# Patient Record
Sex: FEMALE | Race: WHITE | Employment: OTHER | URBAN - METROPOLITAN AREA
[De-identification: names, ages, dates, MRNs, and addresses within clinical notes are randomized per-mention and may not be internally consistent; named-entity substitution may affect disease eponyms.]

---

## 2020-07-27 ENCOUNTER — RECORDS - HEALTHEAST (OUTPATIENT)
Dept: LAB | Facility: CLINIC | Age: 70
End: 2020-07-27

## 2020-07-27 LAB — LIPASE SERPL-CCNC: 207 U/L (ref 0–52)

## 2020-07-30 ENCOUNTER — RECORDS - HEALTHEAST (OUTPATIENT)
Dept: LAB | Facility: CLINIC | Age: 70
End: 2020-07-30

## 2020-07-31 LAB
ALBUMIN SERPL-MCNC: 3.6 G/DL (ref 3.5–5)
ALP SERPL-CCNC: 75 U/L (ref 45–120)
ALT SERPL W P-5'-P-CCNC: 81 U/L (ref 0–45)
ANION GAP SERPL CALCULATED.3IONS-SCNC: 13 MMOL/L (ref 5–18)
AST SERPL W P-5'-P-CCNC: 62 U/L (ref 0–40)
BILIRUB SERPL-MCNC: 0.5 MG/DL (ref 0–1)
BUN SERPL-MCNC: 21 MG/DL (ref 8–22)
CALCIUM SERPL-MCNC: 10.1 MG/DL (ref 8.5–10.5)
CHLORIDE BLD-SCNC: 102 MMOL/L (ref 98–107)
CO2 SERPL-SCNC: 25 MMOL/L (ref 22–31)
CREAT SERPL-MCNC: 0.91 MG/DL (ref 0.6–1.1)
GFR SERPL CREATININE-BSD FRML MDRD: >60 ML/MIN/1.73M2
GLUCOSE BLD-MCNC: 103 MG/DL (ref 70–125)
LIPASE SERPL-CCNC: 307 U/L (ref 0–52)
POTASSIUM BLD-SCNC: 4.3 MMOL/L (ref 3.5–5)
PROT SERPL-MCNC: 7.1 G/DL (ref 6–8)
SODIUM SERPL-SCNC: 140 MMOL/L (ref 136–145)

## 2020-08-11 ENCOUNTER — RECORDS - HEALTHEAST (OUTPATIENT)
Dept: LAB | Facility: CLINIC | Age: 70
End: 2020-08-11

## 2020-08-11 LAB — LIPASE SERPL-CCNC: 177 U/L (ref 0–52)

## 2020-08-31 ENCOUNTER — RECORDS - HEALTHEAST (OUTPATIENT)
Dept: LAB | Facility: CLINIC | Age: 70
End: 2020-08-31

## 2020-08-31 LAB — LIPASE SERPL-CCNC: 84 U/L (ref 0–52)

## 2020-10-01 ENCOUNTER — RECORDS - HEALTHEAST (OUTPATIENT)
Dept: LAB | Facility: CLINIC | Age: 70
End: 2020-10-01

## 2020-10-01 LAB — LIPASE SERPL-CCNC: 97 U/L (ref 0–52)

## 2020-10-23 ENCOUNTER — RECORDS - HEALTHEAST (OUTPATIENT)
Dept: LAB | Facility: CLINIC | Age: 70
End: 2020-10-23

## 2020-10-23 LAB
ALBUMIN SERPL-MCNC: 4 G/DL (ref 3.5–5)
ALP SERPL-CCNC: 70 U/L (ref 45–120)
ALT SERPL W P-5'-P-CCNC: 74 U/L (ref 0–45)
ANION GAP SERPL CALCULATED.3IONS-SCNC: 12 MMOL/L (ref 5–18)
AST SERPL W P-5'-P-CCNC: 51 U/L (ref 0–40)
BILIRUB SERPL-MCNC: 0.7 MG/DL (ref 0–1)
BUN SERPL-MCNC: 16 MG/DL (ref 8–22)
CALCIUM SERPL-MCNC: 9.8 MG/DL (ref 8.5–10.5)
CHLORIDE BLD-SCNC: 106 MMOL/L (ref 98–107)
CHOLEST SERPL-MCNC: 228 MG/DL
CO2 SERPL-SCNC: 25 MMOL/L (ref 22–31)
CREAT SERPL-MCNC: 0.83 MG/DL (ref 0.6–1.1)
FASTING STATUS PATIENT QL REPORTED: ABNORMAL
GFR SERPL CREATININE-BSD FRML MDRD: >60 ML/MIN/1.73M2
GLUCOSE BLD-MCNC: 101 MG/DL (ref 70–125)
HDLC SERPL-MCNC: 46 MG/DL
LDLC SERPL CALC-MCNC: 115 MG/DL
LIPASE SERPL-CCNC: 67 U/L (ref 0–52)
POTASSIUM BLD-SCNC: 4.1 MMOL/L (ref 3.5–5)
PROT SERPL-MCNC: 7.3 G/DL (ref 6–8)
SODIUM SERPL-SCNC: 143 MMOL/L (ref 136–145)
TRIGL SERPL-MCNC: 335 MG/DL

## 2021-10-21 ENCOUNTER — LAB REQUISITION (OUTPATIENT)
Dept: LAB | Facility: CLINIC | Age: 71
End: 2021-10-21
Payer: COMMERCIAL

## 2021-10-21 DIAGNOSIS — K85.10 BILIARY ACUTE PANCREATITIS WITHOUT NECROSIS OR INFECTION: ICD-10-CM

## 2021-10-21 LAB
ALBUMIN SERPL-MCNC: 3.7 G/DL (ref 3.5–5)
ALP SERPL-CCNC: 62 U/L (ref 45–120)
ALT SERPL W P-5'-P-CCNC: 42 U/L (ref 0–45)
AST SERPL W P-5'-P-CCNC: 31 U/L (ref 0–40)
BILIRUB DIRECT SERPL-MCNC: 0.2 MG/DL
BILIRUB SERPL-MCNC: 0.8 MG/DL (ref 0–1)
LIPASE SERPL-CCNC: 84 U/L (ref 0–52)
PROT SERPL-MCNC: 7.1 G/DL (ref 6–8)

## 2021-10-21 PROCEDURE — 80076 HEPATIC FUNCTION PANEL: CPT | Mod: ORL | Performed by: FAMILY MEDICINE

## 2021-10-21 PROCEDURE — 83690 ASSAY OF LIPASE: CPT | Mod: ORL | Performed by: FAMILY MEDICINE

## 2022-04-20 ENCOUNTER — LAB REQUISITION (OUTPATIENT)
Dept: LAB | Facility: CLINIC | Age: 72
End: 2022-04-20
Payer: COMMERCIAL

## 2022-04-20 ENCOUNTER — TRANSFERRED RECORDS (OUTPATIENT)
Dept: HEALTH INFORMATION MANAGEMENT | Facility: CLINIC | Age: 72
End: 2022-04-20

## 2022-04-20 DIAGNOSIS — E78.2 MIXED HYPERLIPIDEMIA: ICD-10-CM

## 2022-04-20 LAB
CHOLEST SERPL-MCNC: 221 MG/DL
HBA1C MFR BLD: 5.6 % (ref 4.2–6.1)
HDLC SERPL-MCNC: 44 MG/DL
LDLC SERPL CALC-MCNC: 135 MG/DL
TRIGL SERPL-MCNC: 210 MG/DL

## 2022-04-20 PROCEDURE — 80061 LIPID PANEL: CPT | Mod: ORL | Performed by: NURSE PRACTITIONER

## 2022-04-20 PROCEDURE — 83525 ASSAY OF INSULIN: CPT | Mod: ORL | Performed by: NURSE PRACTITIONER

## 2022-04-21 LAB — INSULIN SERPL-ACNC: 31.5 MU/L (ref 3–25)

## 2022-06-28 ENCOUNTER — MEDICAL CORRESPONDENCE (OUTPATIENT)
Dept: HEALTH INFORMATION MANAGEMENT | Facility: CLINIC | Age: 72
End: 2022-06-28

## 2022-07-01 ENCOUNTER — TELEPHONE (OUTPATIENT)
Dept: NUTRITION | Facility: CLINIC | Age: 72
End: 2022-07-01

## 2022-07-01 NOTE — TELEPHONE ENCOUNTER
Patient called asking if we can submit a prior authorization or pre-approval to her insurance company. She has had this done before with other medical departments and hopes we can do the same. Informed patient that Medicare and supplemental plans usually do not cover MNT unless it is for diabetes or CKD. She has called her insurance company and they informed her we are in her insurance network and should be covered if there is a referral. However, they cannot guarantee coverage until it is billed. She stated that her insurance is really good and even though some doctors' office will tell her certain visits aren't covered, her insurance has covered them. Please advise if a prior authorization or pre-approval can be done to determine if her visit will be covered.    Ruma BERTRAND  Central Scheduler

## 2022-07-01 NOTE — TELEPHONE ENCOUNTER
[facility] has checked on this for other patients and right now, [facility] does not do a prior authorization for a Nutrition visit.  Patient has a nutrition referral and have been advised to have patient check on insurance coverage but a waiver still needs to be signed to let them know that Medicare does not cover Nutrition Visits unless it is for CKD and DM.    Would recommend patient document who she spoke to about coverage in case there are any issues.  Not sure if she provided the visit code for them (01616) and see if they can verify for her?     Jhoana De La Rosa RDN, LD, [facility]

## 2022-07-05 NOTE — TELEPHONE ENCOUNTER
Called patient and left her detailed message. See notes below.    Ruma BERTRAND  Central Scheduler

## 2022-08-24 ENCOUNTER — OFFICE VISIT (OUTPATIENT)
Dept: NUTRITION | Facility: CLINIC | Age: 72
End: 2022-08-24
Payer: COMMERCIAL

## 2022-08-24 VITALS — BODY MASS INDEX: 40.18 KG/M2 | HEIGHT: 66 IN | WEIGHT: 250 LBS

## 2022-08-24 DIAGNOSIS — E66.01 MORBID OBESITY (H): Primary | ICD-10-CM

## 2022-08-24 PROCEDURE — 97802 MEDICAL NUTRITION INDIV IN: CPT

## 2022-08-24 NOTE — PROGRESS NOTES
Medical Nutrition Therapy  Visit Type:Initial assessment and intervention    Shira Sauer presents today for MNT and education related to weight management.   She is accompanied by self.  Non-Medicare ABN was obtained.    ASSESSMENT:   Patient comments/concerns relating to nutrition: Says she was told she should be on a low cholesterol diet for fatty liver. She has high triglycerides so was told she needs a low-carbohydrate diet and high fiber.      Says she needs help understanding what she should and should not be eating. Says changed to Zevia drink instead of diet pop. Says she already lost 20 lbs but has hit a plateau.    NUTRITION HISTORY:    Breakfast: 8 oz 1% milk, 5.3 oz Dannon light & Fit Greek Cherry yogurt with handful of blueberries and cinnamon, 2 sl. Center cut menjivar, 2 fried in in Lauren spray OR yogurt with blueberries, oatmeal pkt (100 kcals) and 8oz milk with Vitamins OR Pappas bar and shake  AM: grapes OR none OR handful pistachios and Ice drink (5 kcals)  Lunch: Atkins bar and shake, 1/4 cup pistachios and Zevia cola OR 8 oz iced tea, 1 ear corn with butter, leftovers (1 rib, few green beans, 3 small beef pieces) OR salad with walnuts, chicken, blue cheese, raspberry vinaigrette and iced tea with lemon   PM: 2 lemon drops OR Zevia coke, 1 T. PB, handful pistachios OR none  Dinner: salmon and goat cheese salad with lettuce, craisins, apples, tomatoes and raspberry vinaigrette and roll with honey butter (out) OR Atkins shake and bar OR eggs benedict (2 eggs, 1 veggie cake, 1 slice low-fat cheese and hollandaise sauce, 4 oz OJ  Snacks: handful pistachios OR none (day 1&3)  Beverages: Tea  0-1/day and Water 4-5 cups/day    Misses meals? none  Eats out:  At least 1 meals/per week     Previous diet education:  No     Food allergies/intolerances: none    Diet is high in: protein  Diet is low in: fiber, fruits and vegetables    EXERCISE: Silver Sneakers for 45 minutes, 3 times a week. Says walks  "around a lot.     SOCIO/ECONOMIC:   Lives with: spouse. He does most of the cooking and uses mostly olive oil.     MEDICATIONS:  No current outpatient medications on file.     No current facility-administered medications for this visit.       LABS:  Last Basic Metabolic Panel:  Lab Results   Component Value Date     10/23/2020      Lab Results   Component Value Date    POTASSIUM 4.1 10/23/2020     Lab Results   Component Value Date    CHLORIDE 106 10/23/2020     Lab Results   Component Value Date    WAYLON 9.8 10/23/2020     Lab Results   Component Value Date    CO2 25 10/23/2020     Lab Results   Component Value Date    BUN 16 10/23/2020     Lab Results   Component Value Date    CR 0.83 10/23/2020     Lab Results   Component Value Date     10/23/2020     Recent Labs   Lab Test 04/20/22  0931 10/23/20  1335   CHOL 221* 228*   HDL 44* 46*   * 115   TRIG 210* 335*       ANTHROPOMETRICS:  Vitals: There were no vitals taken for this visit.  Estimated body mass index is 40.97 kg/m  as calculated from the following:    Height as of this encounter: 1.664 m (5' 5.5\").    Weight as of this encounter: 113.4 kg (250 lb).       Wt Readings from Last 5 Encounters:   No data found for Wt       Weight Change: unable to assess- patient declined weight today    ESTIMATED KCAL REQUIREMENTS:  1998 kcal per day (based on last weight measurements in referral on 6/28/22)    NUTRITION DIAGNOSIS: Food- and nutrition-related knowledge deficit related to healthy eating to help improve lipid panel and weight loss as evidenced by patient stating she is not sure how to eat to improve her cholesterol and has reached a plateau with weight loss.     NUTRITION INTERVENTION:  Nutrition Prescription: Energy Intake: 2069-7240 kcal/day for weight loss  Saturated Fat Intake: <13 grams/day (7% kcals)  Sodium Intake: <2300 mg/day  Fiber: 25-35 gm/day  Education given to support: general nutrition guidelines, weight reduction, consistent " meals, labeling, artificial sweeteners, sodium, fat modification, exercise, fiber, behavior modification, portion control and heart healthy diet  Education Materials Provided: 1500 Calorie 5-day Sample Menus, 100 Calorie Snacks, Weight Loss Tips, Cooking Tips for Weight Management, High Triglycerides Nutrition Therapy and Heart-Healthy Fiber Tips  Motivational Interviewing    Discussion: Reviewed patient's 24 hour diet recall and answered questions about healthy eating to help improve cholesterol.  Reviewed label reading and provided tips on controlling and limiting saturated fat and added sugars and increasing fiber. She brought in food labels to review and reassured Shira that most of her food choices/changes looked great. Only one product (Adkin's bar) had more saturated fat than ideal so discussed finding alternative or eating 1/2 bar and adding some fruit or veggies.  Encouraged her to increase plant foods such as whole grains, bean, nuts, seeds, fruits and vegetables to help increase fiber. Heart healthy fats were encouraged in moderation, as was lean meats and heathy preparation methods to help with weight loss and improve LDL cholesterol.  Reviewed label reading and patient encouraged to limit saturated fat to 13 gm/day and read ingredient list to help avoid trans fat consumption.      Overall, per diet recall, already appears to be minimal in added sugar and not high in saturated fat as she says eating red meat is rare. She wanted to know how much sodium to limit and informed her if blood pressure is good, try to keep <2300 mg/day.  Since she is struggling with weight loss, suggested trying to record meals for 3-4 days and take average and then either reduce by 100-200 calories if eating more than 1500 calories a day or try to increase by 100 calories for 1-2 week and then cut back to see if this helps overcome plateau. If too calorie heavy, suggested adding some fruits and vegetables with meals so  making up 1/2 the meal and try cutting back on other foods/bars/shakes for lunch, dinner and/or snacks; she already adds fruit with most breakfast.  Based on one day when added up calories, between 7222-7586 a day. Reviewed benefits of being active and commended patient on already including some strength/resistance 3 days a week with Silver Sneakers and to keep adding activity as tolerated to help with weight loss.  Pt verbalized understanding of concepts discussed and recommendations provided.         PATIENT'S BEHAVIOR CHANGE GOALS:   See Patient Instructions for patient stated behavior change goals. AVS was printed and given to patient at today's appointment.    MONITOR / EVALUATE:  RD will monitor/evaluate:  Food / Beverage / Nutrient intake   Pertinent Labs  Progress toward meeting stated nutrition-related goals  Weight change    FOLLOW-UP:  Follow up with RD as needed.  Call RD with questions/concerns. Follow up offered but declined as patient wanted to make sure visit covered (was told by her insurance it would be covered).    Jhoana De La Rosa RDN, LD, [facility]   Time spent in minutes: 60 minutes  Encounter: Individual

## 2022-08-24 NOTE — PATIENT INSTRUCTIONS
Limit saturated fat to <13 gm/day.  -Tip: look for foods that mostly provide 0-2 gm saturated fat per serving.  -Already made good changes to reduce saturated fat.  -Keep eating lean meat and low-fat dairy to keep saturated fat intake low.    2. Keep eating foods with minimal added sugar to help lower triglycerides.    3. Want to keep cholesterol to <200 mg/day (limit eggs to 3-4 a week). Ok to use egg beaters or egg whites.    4. Goal for fiber is minimum 25 gm/day- slowly build to this.  -Will get good fiber from fruits and vegetables, whole grains, nuts and seeds  -Try to include a veggie and/or fruit with most meals    5. Plate Method at meals:  1/2 plate veggies OR 1/4 plate veggies and 1/4 plate fruit (raw, frozen and canned if rinsed or are no-salt added/low-sugar are all fine)  1/4 plate lean meat (3-4 oz)  1/4 plate grains, ideally whole grains when able (1/2-1 cup rice/pasta/other grains/potatoes OR 1-2 slices bread)    Helpful Website: www.myplate.gov    6. Try tracking calories for 3-4 days (2 weekday and 2 weekend days) and from there, try to pull out 100-200 calories if eating more than 1500 a day on average. If below this, could try adding in 100 calories.   Estimated energy needs: 1344-0152 calories a day for weight loss  -If cut back on higher calorie foods, try to add a fruits and/or vegetables or make sure a little come in with each meal (will pull out calories and add more fiber).    FOLLOW UP: Call if follow up is desired.    Jhoana De La Rosa RDN, LD, [facility]   [phone]

## 2022-09-19 ENCOUNTER — LAB REQUISITION (OUTPATIENT)
Dept: LAB | Facility: CLINIC | Age: 72
End: 2022-09-19
Payer: COMMERCIAL

## 2022-09-19 DIAGNOSIS — E78.2 MIXED HYPERLIPIDEMIA: ICD-10-CM

## 2022-09-19 LAB
ALBUMIN SERPL BCG-MCNC: 4.2 G/DL (ref 3.5–5.2)
ALP SERPL-CCNC: 69 U/L (ref 35–104)
ALT SERPL W P-5'-P-CCNC: 26 U/L (ref 10–35)
ANION GAP SERPL CALCULATED.3IONS-SCNC: 13 MMOL/L (ref 7–15)
AST SERPL W P-5'-P-CCNC: 21 U/L (ref 10–35)
BILIRUB SERPL-MCNC: 0.5 MG/DL
BUN SERPL-MCNC: 25.9 MG/DL (ref 8–23)
CALCIUM SERPL-MCNC: 10 MG/DL (ref 8.8–10.2)
CHLORIDE SERPL-SCNC: 105 MMOL/L (ref 98–107)
CHOLEST SERPL-MCNC: 251 MG/DL
CREAT SERPL-MCNC: 0.89 MG/DL (ref 0.51–0.95)
DEPRECATED HCO3 PLAS-SCNC: 26 MMOL/L (ref 22–29)
GFR SERPL CREATININE-BSD FRML MDRD: 69 ML/MIN/1.73M2
GLUCOSE SERPL-MCNC: 123 MG/DL (ref 70–99)
HDLC SERPL-MCNC: 42 MG/DL
INSULIN SERPL-ACNC: 35.5 UU/ML (ref 2.6–24.9)
LDLC SERPL CALC-MCNC: 146 MG/DL
NONHDLC SERPL-MCNC: 209 MG/DL
POTASSIUM SERPL-SCNC: 4.5 MMOL/L (ref 3.4–5.3)
PROT SERPL-MCNC: 7 G/DL (ref 6.4–8.3)
SODIUM SERPL-SCNC: 144 MMOL/L (ref 136–145)
TRIGL SERPL-MCNC: 315 MG/DL

## 2022-09-19 PROCEDURE — 80053 COMPREHEN METABOLIC PANEL: CPT | Mod: ORL | Performed by: NURSE PRACTITIONER

## 2022-09-19 PROCEDURE — 83525 ASSAY OF INSULIN: CPT | Mod: ORL | Performed by: NURSE PRACTITIONER

## 2022-09-19 PROCEDURE — 80061 LIPID PANEL: CPT | Mod: ORL | Performed by: NURSE PRACTITIONER

## 2022-09-26 ENCOUNTER — LAB REQUISITION (OUTPATIENT)
Dept: LAB | Facility: CLINIC | Age: 72
End: 2022-09-26
Payer: COMMERCIAL

## 2022-09-26 DIAGNOSIS — E55.9 VITAMIN D DEFICIENCY, UNSPECIFIED: ICD-10-CM

## 2022-09-26 DIAGNOSIS — G25.81 RESTLESS LEGS SYNDROME: ICD-10-CM

## 2022-09-26 LAB
DEPRECATED CALCIDIOL+CALCIFEROL SERPL-MC: 44 UG/L (ref 20–75)
MAGNESIUM SERPL-MCNC: 2.2 MG/DL (ref 1.7–2.3)
VIT B12 SERPL-MCNC: 377 PG/ML (ref 232–1245)

## 2022-09-26 PROCEDURE — 82607 VITAMIN B-12: CPT | Mod: ORL | Performed by: NURSE PRACTITIONER

## 2022-09-26 PROCEDURE — 82306 VITAMIN D 25 HYDROXY: CPT | Mod: ORL | Performed by: NURSE PRACTITIONER

## 2022-09-26 PROCEDURE — 83735 ASSAY OF MAGNESIUM: CPT | Mod: ORL | Performed by: NURSE PRACTITIONER

## 2022-10-29 ENCOUNTER — HEALTH MAINTENANCE LETTER (OUTPATIENT)
Age: 72
End: 2022-10-29

## 2023-01-10 ENCOUNTER — LAB REQUISITION (OUTPATIENT)
Dept: LAB | Facility: CLINIC | Age: 73
End: 2023-01-10
Payer: COMMERCIAL

## 2023-01-10 DIAGNOSIS — E88.810 METABOLIC SYNDROME: ICD-10-CM

## 2023-01-10 DIAGNOSIS — E55.9 VITAMIN D DEFICIENCY, UNSPECIFIED: ICD-10-CM

## 2023-01-10 DIAGNOSIS — R73.03 PREDIABETES: ICD-10-CM

## 2023-01-10 DIAGNOSIS — R35.0 FREQUENCY OF MICTURITION: ICD-10-CM

## 2023-01-10 DIAGNOSIS — E78.2 MIXED HYPERLIPIDEMIA: ICD-10-CM

## 2023-01-10 LAB
ALBUMIN SERPL BCG-MCNC: 4.1 G/DL (ref 3.5–5.2)
ALP SERPL-CCNC: 66 U/L (ref 35–104)
ALT SERPL W P-5'-P-CCNC: 27 U/L (ref 10–35)
ANION GAP SERPL CALCULATED.3IONS-SCNC: 16 MMOL/L (ref 7–15)
AST SERPL W P-5'-P-CCNC: 22 U/L (ref 10–35)
BILIRUB SERPL-MCNC: 0.6 MG/DL
BUN SERPL-MCNC: 26.4 MG/DL (ref 8–23)
CALCIUM SERPL-MCNC: 10.2 MG/DL (ref 8.8–10.2)
CHLORIDE SERPL-SCNC: 104 MMOL/L (ref 98–107)
CHOLEST SERPL-MCNC: 263 MG/DL
CREAT SERPL-MCNC: 0.88 MG/DL (ref 0.51–0.95)
DEPRECATED HCO3 PLAS-SCNC: 23 MMOL/L (ref 22–29)
GFR SERPL CREATININE-BSD FRML MDRD: 70 ML/MIN/1.73M2
GLUCOSE SERPL-MCNC: 134 MG/DL (ref 70–99)
HBA1C MFR BLD: 5.9 %
HDLC SERPL-MCNC: 47 MG/DL
INSULIN SERPL-ACNC: 44.3 UU/ML (ref 2.6–24.9)
LDLC SERPL CALC-MCNC: 143 MG/DL
NONHDLC SERPL-MCNC: 216 MG/DL
POTASSIUM SERPL-SCNC: 4.5 MMOL/L (ref 3.4–5.3)
PROT SERPL-MCNC: 6.8 G/DL (ref 6.4–8.3)
SODIUM SERPL-SCNC: 143 MMOL/L (ref 136–145)
TRIGL SERPL-MCNC: 363 MG/DL
VIT B12 SERPL-MCNC: 364 PG/ML (ref 232–1245)

## 2023-01-10 PROCEDURE — 80061 LIPID PANEL: CPT | Mod: ORL | Performed by: NURSE PRACTITIONER

## 2023-01-10 PROCEDURE — 83036 HEMOGLOBIN GLYCOSYLATED A1C: CPT | Mod: ORL | Performed by: NURSE PRACTITIONER

## 2023-01-10 PROCEDURE — 87086 URINE CULTURE/COLONY COUNT: CPT | Mod: ORL | Performed by: NURSE PRACTITIONER

## 2023-01-10 PROCEDURE — 82607 VITAMIN B-12: CPT | Mod: ORL | Performed by: NURSE PRACTITIONER

## 2023-01-10 PROCEDURE — 80053 COMPREHEN METABOLIC PANEL: CPT | Mod: ORL | Performed by: NURSE PRACTITIONER

## 2023-01-10 PROCEDURE — 83525 ASSAY OF INSULIN: CPT | Mod: ORL | Performed by: NURSE PRACTITIONER

## 2023-01-10 PROCEDURE — 82306 VITAMIN D 25 HYDROXY: CPT | Mod: ORL | Performed by: NURSE PRACTITIONER

## 2023-01-11 LAB — DEPRECATED CALCIDIOL+CALCIFEROL SERPL-MC: 35 UG/L (ref 20–75)

## 2023-01-12 LAB — BACTERIA UR CULT: NORMAL

## 2023-03-09 ENCOUNTER — LAB REQUISITION (OUTPATIENT)
Dept: LAB | Facility: CLINIC | Age: 73
End: 2023-03-09
Payer: COMMERCIAL

## 2023-03-09 DIAGNOSIS — K75.81 NONALCOHOLIC STEATOHEPATITIS (NASH): ICD-10-CM

## 2023-03-09 LAB
AFP SERPL-MCNC: 2.7 NG/ML
ALBUMIN SERPL BCG-MCNC: 4.1 G/DL (ref 3.5–5.2)
ALP SERPL-CCNC: 65 U/L (ref 35–104)
ALT SERPL W P-5'-P-CCNC: 29 U/L (ref 10–35)
AST SERPL W P-5'-P-CCNC: 18 U/L (ref 10–35)
BASOPHILS # BLD AUTO: 0.1 10E3/UL (ref 0–0.2)
BASOPHILS NFR BLD AUTO: 1 %
BILIRUB DIRECT SERPL-MCNC: <0.2 MG/DL (ref 0–0.3)
BILIRUB SERPL-MCNC: 0.5 MG/DL
CREAT SERPL-MCNC: 0.89 MG/DL (ref 0.51–0.95)
EOSINOPHIL # BLD AUTO: 0.1 10E3/UL (ref 0–0.7)
EOSINOPHIL NFR BLD AUTO: 2 %
ERYTHROCYTE [DISTWIDTH] IN BLOOD BY AUTOMATED COUNT: 13.1 % (ref 10–15)
GFR SERPL CREATININE-BSD FRML MDRD: 69 ML/MIN/1.73M2
HCT VFR BLD AUTO: 46.8 % (ref 35–47)
HGB BLD-MCNC: 14.6 G/DL (ref 11.7–15.7)
IMM GRANULOCYTES # BLD: 0 10E3/UL
IMM GRANULOCYTES NFR BLD: 0 %
INR PPP: 1.01 (ref 0.85–1.15)
LYMPHOCYTES # BLD AUTO: 2 10E3/UL (ref 0.8–5.3)
LYMPHOCYTES NFR BLD AUTO: 31 %
MCH RBC QN AUTO: 29.6 PG (ref 26.5–33)
MCHC RBC AUTO-ENTMCNC: 31.2 G/DL (ref 31.5–36.5)
MCV RBC AUTO: 95 FL (ref 78–100)
MONOCYTES # BLD AUTO: 0.5 10E3/UL (ref 0–1.3)
MONOCYTES NFR BLD AUTO: 7 %
NEUTROPHILS # BLD AUTO: 3.7 10E3/UL (ref 1.6–8.3)
NEUTROPHILS NFR BLD AUTO: 59 %
NRBC # BLD AUTO: 0 10E3/UL
NRBC BLD AUTO-RTO: 0 /100
PLATELET # BLD AUTO: 260 10E3/UL (ref 150–450)
PROT SERPL-MCNC: 6.5 G/DL (ref 6.4–8.3)
RBC # BLD AUTO: 4.94 10E6/UL (ref 3.8–5.2)
WBC # BLD AUTO: 6.3 10E3/UL (ref 4–11)

## 2023-03-09 PROCEDURE — 82105 ALPHA-FETOPROTEIN SERUM: CPT | Mod: ORL | Performed by: FAMILY MEDICINE

## 2023-03-09 PROCEDURE — 80076 HEPATIC FUNCTION PANEL: CPT | Mod: ORL | Performed by: FAMILY MEDICINE

## 2023-03-09 PROCEDURE — 85610 PROTHROMBIN TIME: CPT | Mod: ORL | Performed by: FAMILY MEDICINE

## 2023-03-09 PROCEDURE — 82565 ASSAY OF CREATININE: CPT | Mod: ORL | Performed by: FAMILY MEDICINE

## 2023-03-09 PROCEDURE — 85025 COMPLETE CBC W/AUTO DIFF WBC: CPT | Mod: ORL | Performed by: FAMILY MEDICINE

## 2023-04-11 ENCOUNTER — LAB REQUISITION (OUTPATIENT)
Dept: LAB | Facility: CLINIC | Age: 73
End: 2023-04-11
Payer: COMMERCIAL

## 2023-04-11 DIAGNOSIS — E88.810 METABOLIC SYNDROME: ICD-10-CM

## 2023-04-11 LAB — INSULIN SERPL-ACNC: 41.2 UU/ML (ref 2.6–24.9)

## 2023-04-11 PROCEDURE — 83525 ASSAY OF INSULIN: CPT | Mod: ORL | Performed by: NURSE PRACTITIONER

## 2023-07-12 ENCOUNTER — LAB REQUISITION (OUTPATIENT)
Dept: LAB | Facility: CLINIC | Age: 73
End: 2023-07-12
Payer: COMMERCIAL

## 2023-07-12 DIAGNOSIS — E88.810 METABOLIC SYNDROME: ICD-10-CM

## 2023-07-12 LAB — INSULIN SERPL-ACNC: 38.5 UU/ML (ref 2.6–24.9)

## 2023-07-12 PROCEDURE — 83525 ASSAY OF INSULIN: CPT | Mod: ORL | Performed by: NURSE PRACTITIONER

## 2023-07-18 ENCOUNTER — LAB REQUISITION (OUTPATIENT)
Dept: LAB | Facility: CLINIC | Age: 73
End: 2023-07-18
Payer: COMMERCIAL

## 2023-07-18 DIAGNOSIS — E78.2 MIXED HYPERLIPIDEMIA: ICD-10-CM

## 2023-07-18 DIAGNOSIS — E55.9 VITAMIN D DEFICIENCY, UNSPECIFIED: ICD-10-CM

## 2023-07-18 DIAGNOSIS — E88.810 METABOLIC SYNDROME: ICD-10-CM

## 2023-07-18 DIAGNOSIS — E53.8 DEFICIENCY OF OTHER SPECIFIED B GROUP VITAMINS: ICD-10-CM

## 2023-07-18 LAB
ALBUMIN SERPL BCG-MCNC: 4.2 G/DL (ref 3.5–5.2)
ALP SERPL-CCNC: 62 U/L (ref 35–104)
ALT SERPL W P-5'-P-CCNC: 33 U/L (ref 0–50)
ANION GAP SERPL CALCULATED.3IONS-SCNC: 16 MMOL/L (ref 7–15)
AST SERPL W P-5'-P-CCNC: 25 U/L (ref 0–45)
BILIRUB SERPL-MCNC: 0.4 MG/DL
BUN SERPL-MCNC: 16.6 MG/DL (ref 8–23)
CALCIUM SERPL-MCNC: 9.8 MG/DL (ref 8.8–10.2)
CHLORIDE SERPL-SCNC: 104 MMOL/L (ref 98–107)
CHOLEST SERPL-MCNC: 240 MG/DL
CREAT SERPL-MCNC: 0.86 MG/DL (ref 0.51–0.95)
DEPRECATED CALCIDIOL+CALCIFEROL SERPL-MC: 38 UG/L (ref 20–75)
DEPRECATED HCO3 PLAS-SCNC: 22 MMOL/L (ref 22–29)
GFR SERPL CREATININE-BSD FRML MDRD: 71 ML/MIN/1.73M2
GLUCOSE SERPL-MCNC: 133 MG/DL (ref 70–99)
HDLC SERPL-MCNC: 43 MG/DL
LDLC SERPL CALC-MCNC: 125 MG/DL
NONHDLC SERPL-MCNC: 197 MG/DL
POTASSIUM SERPL-SCNC: 4.8 MMOL/L (ref 3.4–5.3)
PROT SERPL-MCNC: 6.6 G/DL (ref 6.4–8.3)
SODIUM SERPL-SCNC: 142 MMOL/L (ref 136–145)
TRIGL SERPL-MCNC: 362 MG/DL
VIT B12 SERPL-MCNC: 468 PG/ML (ref 232–1245)

## 2023-07-18 PROCEDURE — 82306 VITAMIN D 25 HYDROXY: CPT | Mod: ORL | Performed by: NURSE PRACTITIONER

## 2023-07-18 PROCEDURE — 80053 COMPREHEN METABOLIC PANEL: CPT | Mod: ORL | Performed by: NURSE PRACTITIONER

## 2023-07-18 PROCEDURE — 80061 LIPID PANEL: CPT | Mod: ORL | Performed by: NURSE PRACTITIONER

## 2023-07-18 PROCEDURE — 82607 VITAMIN B-12: CPT | Mod: ORL | Performed by: NURSE PRACTITIONER

## 2023-07-25 NOTE — PROGRESS NOTES
Medication Therapy Management (MTM) Encounter    ASSESSMENT:                            Medication Adherence/Access: No issues identified    Metabolic Syndrome: Needs improvement, consider increasing dose of metformin from 750 mg daily to 1500 mg ( two tablets daily). Encouraged diet and lifestyle changes as well.     Hyperlipidemia: Needs improvement. Consider initiation of Repatha for cholesterol lowering.     Restless Leg: Stable.     Supplements: Stable.     PLAN:                            Increase your metformin from one tablet to two tablets daily  I will discuss options for lowering your cholesterol with your PCP.     Follow-up: Return in about 4 weeks (around 8/25/2023) for Follow up.    SUBJECTIVE/OBJECTIVE:                          Shira Sauer is a 73 year old female coming in for an initial visit. She was referred to me from Rika Taylor.      Reason for visit: MTM.     Allergies/ADRs: None  Past Medical History: Reviewed in chart  Tobacco: She reports that she has never smoked. She has never used smokeless tobacco. None   Alcohol: none occasional use       Medication Adherence/Access: no issues reported        Metabolic Syndrome:  Patient in clinic today to discuss diet and exercise. Was started on metformin by PCP earlier this year and denies side effects. Notes she participates in silver sneakers three times weekly for exercise and is working on improving her diet and portion sizes.   Metformin  mg once daily.   A1c:   5.7%   1/10 5.9%  BP Readings from Last 3 Encounters:   07/28/23 138/88       Hyperlipidemia:   Not currently taking anything for cholesterol lowering.   The 10-year ASCVD risk score (Casie PINO, et al., 2019) is: 14.3%    Values used to calculate the score:      Age: 73 years      Sex: Female      Is Non- : No      Diabetic: No      Tobacco smoker: No      Systolic Blood Pressure: 138 mmHg      Is BP treated: No      HDL Cholesterol: 43 mg/dL       Total Cholesterol: 240 mg/dL  Recent Labs   Lab Test 07/18/23  0941 01/10/23  0855   CHOL 240* 263*   HDL 43* 47*   * 143*   TRIG 362* 363*     Restless Leg:   Patient notes current regimen manages symptoms well and denies side effects at this time.   Pramipexole Dihydrochloride 0.125 MG Tablet, Take 2 tablets by mouth once a day, 2-3 hours before bed -- manages well   Relaxing leg cream PM uses every night on both legs     Supplements:   Taking for general health and denies side effects at this time.   Multivitamin once daily one daily   Fiber one scoop with meals   Calcium Citrate 500 mg tablet daily    Glucosamine-Chondroitin 250-200 MG Tablet, 2 tablet with a meal by mouth once a day   PreserVision AREDS - Tablet, two daily   Vitamin D3 25 mcg daily   Magnesium 400 MG Tablet,  one daily       Today's Vitals: /88 (BP Location: Left arm)   Pulse 96   SpO2 96%   ----------------      I spent 35 minutes with this patient today. All changes were made via collaborative practice agreement with KAROLINE Gutierrez CNP and I offer these suggestions for consideration by Rika Taylor. A copy of the visit note was provided to the patient's provider(s).    A summary of these recommendations was declined by the patient.    Sola Marquez, PharmD  Medication Therapy Management Pharmacist       Medication Therapy Recommendations  Abdominal obesity and metabolic syndrome    Current Medication: metFORMIN (GLUCOPHAGE-XR) 750 MG 24 hr tablet   Rationale: Dose too low - Dosage too low - Effectiveness   Recommendation: Increase Dose   Status: Accepted per CPA         Mixed hyperlipidemia    Rationale: Untreated condition - Needs additional medication therapy - Indication   Recommendation: Start Medication   Status: Contact Provider - Awaiting Response   Note: discuss repatha

## 2023-07-28 ENCOUNTER — OFFICE VISIT (OUTPATIENT)
Dept: PHARMACY | Facility: PHYSICIAN GROUP | Age: 73
End: 2023-07-28
Payer: COMMERCIAL

## 2023-07-28 VITALS — SYSTOLIC BLOOD PRESSURE: 138 MMHG | OXYGEN SATURATION: 96 % | DIASTOLIC BLOOD PRESSURE: 88 MMHG | HEART RATE: 96 BPM

## 2023-07-28 DIAGNOSIS — Z78.9 TAKES DIETARY SUPPLEMENTS: ICD-10-CM

## 2023-07-28 DIAGNOSIS — G25.81 RESTLESS LEG SYNDROME: ICD-10-CM

## 2023-07-28 DIAGNOSIS — E88.810 ABDOMINAL OBESITY AND METABOLIC SYNDROME: ICD-10-CM

## 2023-07-28 DIAGNOSIS — E78.2 MIXED HYPERLIPIDEMIA: Primary | ICD-10-CM

## 2023-07-28 DIAGNOSIS — E66.9 ABDOMINAL OBESITY AND METABOLIC SYNDROME: ICD-10-CM

## 2023-07-28 PROCEDURE — 99207 PR NO CHARGE LOS: CPT | Performed by: PHARMACIST

## 2023-07-28 RX ORDER — METFORMIN HCL 500 MG
2000 TABLET, EXTENDED RELEASE 24 HR ORAL
COMMUNITY
Start: 2023-01-11

## 2023-07-28 RX ORDER — VITAMIN B COMPLEX
25 TABLET ORAL DAILY
COMMUNITY

## 2023-07-28 RX ORDER — MAGNESIUM CARB/ALUMINUM HYDROX 105-160MG
2 TABLET,CHEWABLE ORAL DAILY
COMMUNITY

## 2023-07-28 RX ORDER — CALCIUM CARBONATE/VITAMIN D3 500-10/5ML
1 LIQUID (ML) ORAL EVERY EVENING
COMMUNITY

## 2023-07-28 RX ORDER — GUAR GUM
PACKET (EA) ORAL
COMMUNITY
End: 2024-08-26

## 2023-07-28 RX ORDER — LANOLIN ALCOHOL/MO/W.PET/CERES
1 CREAM (GRAM) TOPICAL DAILY
COMMUNITY
Start: 2023-01-31

## 2023-07-28 RX ORDER — MULTIPLE VITAMINS W/ MINERALS TAB 9MG-400MCG
1 TAB ORAL DAILY
COMMUNITY
Start: 2023-01-31 | End: 2023-10-11

## 2023-07-28 RX ORDER — ANTIOX #8/OM3/DHA/EPA/LUT/ZEAX 250-2.5 MG
1 CAPSULE ORAL 2 TIMES DAILY
COMMUNITY

## 2023-07-28 RX ORDER — PRAMIPEXOLE DIHYDROCHLORIDE 0.12 MG/1
0.12 TABLET ORAL 3 TIMES DAILY
COMMUNITY

## 2023-07-28 NOTE — PATIENT INSTRUCTIONS
"Recommendations from today's MTM visit:                                                    MTM (medication therapy management) is a service provided by a clinical pharmacist designed to help you get the most of out of your medicines.   Today we reviewed what your medicines are for, how to know if they are working, that your medicines are safe and how to make your medicine regimen as easy as possible.      Increase your metformin from one tablet to two tablets daily  I will discuss options for lowering your cholesterol with your PCP.     Follow-up: Return in about 4 weeks (around 8/25/2023) for Follow up.    It was great speaking with you today.  I value your experience and would be very thankful for your time in providing feedback in our clinic survey. In the next few days, you may receive an email or text message from [employer] with a link to a survey related to your  clinical pharmacist.\"     To schedule another MTM appointment, please call the clinic directly or you may call the MTM scheduling line at [phone] or toll-free at [phone].     My Clinical Pharmacist's contact information:                                                      Please feel free to contact me with any questions or concerns you have.      Sola Marquez, PharmD  Medication Therapy Management Pharmacist   "

## 2023-10-10 NOTE — PROGRESS NOTES
Medication Therapy Management (MTM) Encounter    ASSESSMENT:                            Medication Adherence/Access: No issues identified    Metabolic Syndrome: Stable. Encouraged diet and lifestyle changes as well.     Hyperlipidemia: Needs improvement. Consider initiation of moderate intensity statin for cholesterol lowering. Will get approval from [facility] provider prior to initiation.     PLAN:                            I will discuss starting Rosuvastatin 10 mg tablet with your PCP and [facility] doctor.   Continue taking all other medications as prescribed.     Follow-up: Return in about 3 months (around 1/11/2024), or if symptoms worsen or fail to improve, for Follow up.    SUBJECTIVE/OBJECTIVE:                          Shira Sauer is a 73 year old female coming in for a follow up visit. She was referred to me from Rika Taylor. Her last visit was with me on 7/28/23.      Reason for visit: MTM.     Allergies/ADRs: None  Past Medical History: Reviewed in chart  Tobacco: She reports that she has never smoked. She has never used smokeless tobacco. None   Alcohol: none occasional use       Medication Adherence/Access: no issues reported      Metabolic Syndrome:  Patient in clinic today to discuss diet and exercise. Since our last visit she has started taking the increased dose of metformin and denies side effects. Notes she participates in silver sneakers three times weekly for exercise and is working on improving her diet and portion sizes. Working on eating less carbs.   Metformin  mg once daily.   A1c:   5.7%   1/10 5.9%  BP Readings from Last 3 Encounters:   10/11/23 122/70   07/28/23 138/88       Hyperlipidemia:   Not currently taking anything for cholesterol lowering. Following with [facility] once yearly, ultrasound every six months. Dr. Olive Cisneros at [facility]. Patient would benefit from statin therapy for LDL reduction.   The 10-year ASCVD risk score (Casie DK, et al., 2019) is:  11.4%    Values used to calculate the score:      Age: 73 years      Sex: Female      Is Non- : No      Diabetic: No      Tobacco smoker: No      Systolic Blood Pressure: 122 mmHg      Is BP treated: No      HDL Cholesterol: 43 mg/dL      Total Cholesterol: 240 mg/dL  Recent Labs   Lab Test 07/18/23  0941 01/10/23  0855   CHOL 240* 263*   HDL 43* 47*   * 143*   TRIG 362* 363*       Today's Vitals: /70 (BP Location: Left arm)   Pulse 91   SpO2 96%   ----------------    I spent 20 minutes with this patient today. I offer these suggestions for consideration by Rika Taylor. A copy of the visit note was provided to the patient's provider(s).    A summary of these recommendations was declined by the patient.    Sola Marquez, PharmD  Medication Therapy Management Pharmacist       Medication Therapy Recommendations  Mixed hyperlipidemia    Rationale: Untreated condition - Needs additional medication therapy - Indication   Recommendation: Start Medication   Status: Contact Provider - Awaiting Response   Note: discuss rosuvastatin

## 2023-10-11 ENCOUNTER — OFFICE VISIT (OUTPATIENT)
Dept: PHARMACY | Facility: PHYSICIAN GROUP | Age: 73
End: 2023-10-11
Payer: COMMERCIAL

## 2023-10-11 VITALS — SYSTOLIC BLOOD PRESSURE: 122 MMHG | HEART RATE: 91 BPM | OXYGEN SATURATION: 96 % | DIASTOLIC BLOOD PRESSURE: 70 MMHG

## 2023-10-11 DIAGNOSIS — E78.2 MIXED HYPERLIPIDEMIA: Primary | ICD-10-CM

## 2023-10-11 DIAGNOSIS — E66.9 ABDOMINAL OBESITY AND METABOLIC SYNDROME: ICD-10-CM

## 2023-10-11 DIAGNOSIS — E88.810 ABDOMINAL OBESITY AND METABOLIC SYNDROME: ICD-10-CM

## 2023-10-11 PROCEDURE — 99207 PR NO CHARGE LOS: CPT | Performed by: PHARMACIST

## 2023-10-11 NOTE — PATIENT INSTRUCTIONS
"Recommendations from today's MTM visit:                                                       I will discuss starting Rosuvastatin 10 mg tablet with your PCP and [facility] doctor.   Continue taking all other medications as prescribed.     Follow-up: Return in about 3 months (around 1/11/2024), or if symptoms worsen or fail to improve, for Follow up.    It was great speaking with you today.  I value your experience and would be very thankful for your time in providing feedback in our clinic survey. In the next few days, you may receive an email or text message from [employer] with a link to a survey related to your  clinical pharmacist.\"     To schedule another MTM appointment, please call the clinic directly or you may call the MTM scheduling line at [phone] or toll-free at [phone].     My Clinical Pharmacist's contact information:                                                      Please feel free to contact me with any questions or concerns you have.      Sola Marquez, PharmD  Medication Therapy Management Pharmacist   "

## 2023-10-30 ENCOUNTER — VIRTUAL VISIT (OUTPATIENT)
Dept: PHARMACY | Facility: PHYSICIAN GROUP | Age: 73
End: 2023-10-30
Payer: COMMERCIAL

## 2023-10-30 DIAGNOSIS — E78.2 MIXED HYPERLIPIDEMIA: Primary | ICD-10-CM

## 2023-10-30 PROCEDURE — 99207 PR NO CHARGE LOS: CPT | Performed by: PHARMACIST

## 2023-10-30 NOTE — PATIENT INSTRUCTIONS
"Recommendations from today's MTM visit:                                                       Begin taking Rosuvastatin 10 mg tablet by mouth daily     Follow-up: Return in about 5 weeks (around 12/4/2023) for Follow up labs.    It was great speaking with you today.  I value your experience and would be very thankful for your time in providing feedback in our clinic survey. In the next few days, you may receive an email or text message from [employer] with a link to a survey related to your  clinical pharmacist.\"     To schedule another MTM appointment, please call the clinic directly or you may call the MTM scheduling line at [phone] or toll-free at [phone].     My Clinical Pharmacist's contact information:                                                      Please feel free to contact me with any questions or concerns you have.      Sola Marquez, PharmD  Medication Therapy Management Pharmacist   "

## 2023-10-30 NOTE — PROGRESS NOTES
Medication Therapy Management (MTM) Encounter    ASSESSMENT:                            Medication Adherence/Access: No issues identified    Hyperlipidemia: Needs improvement. Consider initiation of moderate intensity statin for cholesterol lowering. Recheck labs would be due in 4-8 weeks.     PLAN:                            Begin taking Rosuvastatin 10 mg tablet by mouth daily     Follow-up: Return in about 5 weeks (around 12/4/2023) for Follow up labs.    SUBJECTIVE/OBJECTIVE:                          Shira Sauer is a 73 year old female called for a follow up visit. She was referred to me from Rika Taylor. Her last visit was with me on 10/11/23.      Reason for visit: MTM.     Allergies/ADRs: None  Past Medical History: Reviewed in chart  Tobacco: She reports that she has never smoked. She has never used smokeless tobacco.  Alcohol: occasional use       Medication Adherence/Access: no issues reported      Hyperlipidemia:   Not currently taking anything for cholesterol lowering. Following with [facility] once yearly, ultrasound every six months. Dr. Olive Cisneros at [facility]. Patient would benefit from statin therapy for LDL reduction. We discussed common side effects with statins and how they can benefit our cholesterol. Had conversation with patient's PCP regarding initiation of therapy and she agrees with the plan.     The 10-year ASCVD risk score ([place] DK, et al., 2019) is: 11.4%    Values used to calculate the score:      Age: 73 years      Sex: Female      Is Non- : No      Diabetic: No      Tobacco smoker: No      Systolic Blood Pressure: 122 mmHg      Is BP treated: No      HDL Cholesterol: 43 mg/dL      Total Cholesterol: 240 mg/dL  Recent Labs   Lab Test 07/18/23  0941 01/10/23  0855   CHOL 240* 263*   HDL 43* 47*   * 143*   TRIG 362* 363*       Today's Vitals: There were no vitals taken for this visit.  ----------------    I spent 5 minutes with this patient  today. All changes were made via CPA with Rika Taylor. A copy of the visit note was provided to the patient's provider(s).    A summary of these recommendations was declined by the patient.    Sola Marquez PharmD  Medication Therapy Management Pharmacist    Telemedicine Visit Details  Type of service:  Telephone visit  Start Time:  2:14 PM  End Time:  2:19 PM       Medication Therapy Recommendations  Mixed hyperlipidemia    Rationale: Untreated condition - Needs additional medication therapy - Indication   Recommendation: Start Medication   Status: Accepted per Provider   Note: discuss rosuvastatin

## 2023-11-11 ENCOUNTER — HEALTH MAINTENANCE LETTER (OUTPATIENT)
Age: 73
End: 2023-11-11

## 2024-01-23 ENCOUNTER — LAB REQUISITION (OUTPATIENT)
Dept: LAB | Facility: CLINIC | Age: 74
End: 2024-01-23
Payer: COMMERCIAL

## 2024-01-23 DIAGNOSIS — E78.2 MIXED HYPERLIPIDEMIA: ICD-10-CM

## 2024-01-23 LAB
ALBUMIN SERPL BCG-MCNC: 4.2 G/DL (ref 3.5–5.2)
ALP SERPL-CCNC: 53 U/L (ref 40–150)
ALT SERPL W P-5'-P-CCNC: 41 U/L (ref 0–50)
ANION GAP SERPL CALCULATED.3IONS-SCNC: 16 MMOL/L (ref 7–15)
AST SERPL W P-5'-P-CCNC: 33 U/L (ref 0–45)
BILIRUB SERPL-MCNC: 0.8 MG/DL
BUN SERPL-MCNC: 22.4 MG/DL (ref 8–23)
CALCIUM SERPL-MCNC: 10.3 MG/DL (ref 8.8–10.2)
CHLORIDE SERPL-SCNC: 106 MMOL/L (ref 98–107)
CHOLEST SERPL-MCNC: 127 MG/DL
CREAT SERPL-MCNC: 0.89 MG/DL (ref 0.51–0.95)
DEPRECATED HCO3 PLAS-SCNC: 23 MMOL/L (ref 22–29)
EGFRCR SERPLBLD CKD-EPI 2021: 69 ML/MIN/1.73M2
FASTING STATUS PATIENT QL REPORTED: ABNORMAL
GLUCOSE SERPL-MCNC: 116 MG/DL (ref 70–99)
HDLC SERPL-MCNC: 43 MG/DL
LDLC SERPL CALC-MCNC: 49 MG/DL
NONHDLC SERPL-MCNC: 84 MG/DL
POTASSIUM SERPL-SCNC: 4.4 MMOL/L (ref 3.4–5.3)
PROT SERPL-MCNC: 7 G/DL (ref 6.4–8.3)
SODIUM SERPL-SCNC: 145 MMOL/L (ref 135–145)
TRIGL SERPL-MCNC: 177 MG/DL

## 2024-01-23 PROCEDURE — 80061 LIPID PANEL: CPT | Mod: ORL | Performed by: NURSE PRACTITIONER

## 2024-01-23 PROCEDURE — 80053 COMPREHEN METABOLIC PANEL: CPT | Mod: ORL | Performed by: NURSE PRACTITIONER

## 2024-04-11 RX ORDER — ROSUVASTATIN CALCIUM 10 MG/1
10 TABLET, COATED ORAL DAILY
COMMUNITY

## 2024-04-11 NOTE — PROGRESS NOTES
Medication Therapy Management (MTM) Encounter    ASSESSMENT:                            Medication Adherence/Access: No issues identified    Metabolic Syndrome: Stable.     Hyperlipidemia: Patient has made major improvement since starting statin.     Restless Leg:  Patient reports symptoms have been more bothersome earlier in the day. Will have her take one additional tablet in the afternoon and continue with two tablets before bedtime.     Supplements: Stable.     PLAN:                            You may begin taking one tablet of pramipexole in the afternoon before onset of your restless leg symptoms. Continue taking your two tablets before bedtime as well.   Continue taking all other medications as prescribed.     Follow-up: Return in 4 weeks (on 5/10/2024) for Follow up, with me, in person.    SUBJECTIVE/OBJECTIVE:                          Shira Sauer is a 74 year old female coming in for an initial visit. She was referred to me from Rika Taylor.      Reason for visit: MTM.     Allergies/ADRs: None  Past Medical History: Reviewed in chart  Tobacco: She reports that she has never smoked. She has never used smokeless tobacco. None   Alcohol: 2-4 drinks monthly      Medication Adherence/Access: no issues reported        Metabolic Syndrome:  Patient met with [facility] earlier this week who provided her with nutrition information. She would like to avoid injectable weight loss medications at this time. Notes she participates in silver sneakers three times weekly for exercise and is working on improving her diet and portion sizes.     Metformin  mg two tablets by mouth daily.   A1c:   1/23/24 5.5%    BP Readings from Last 3 Encounters:   04/12/24 (!) 140/82   10/11/23 122/70   07/28/23 138/88       Hyperlipidemia:   Rosuvastatin 10 mg tablet by mouth daily   Denies side effects.  The ASCVD Risk score (Casie DK, et al., 2019) failed to calculate for the following reasons:    The valid total cholesterol range  is 130 to 320 mg/dL  Recent Labs   Lab Test 01/23/24  0755 07/18/23  0941   CHOL 127 240*   HDL 43* 43*   LDL 49 125*   TRIG 177* 362*       Restless Leg:   Patient notes her symptoms have been more bothersome earlier in the day, bedtime is still well controlled. Denies side effects at this time.     Pramipexole Dihydrochloride 0.125 MG Tablet, Take 2 tablets by mouth once a day, 2-3 hours before bed   Relaxing leg cream PM uses every night on both legs     Supplements:   Taking for general health and denies side effects at this time.   Multivitamin once daily one daily   Fiber one scoop with meals   Calcium Citrate 500 mg tablet daily    Glucosamine-Chondroitin 250-200 MG Tablet, 2 tablet with a meal by mouth once a day   PreserVision AREDS - Tablet, two daily   Vitamin D3 25 mcg one tablet daily   Magnesium 400 MG Tablet,  one daily       Today's Vitals: BP (!) 140/82 (BP Location: Left arm)   Wt 254 lb (115.2 kg)   BMI 41.62 kg/m    ----------------      I spent 30 minutes with this patient today. All changes were made via collaborative practice agreement with KAROLINE Gutierrez CNP and I offer these suggestions for consideration by Rika Taylor. A copy of the visit note was provided to the patient's provider(s).    A summary of these recommendations was sent via [employer] to the patient.    Sola Marquez, PharmD  Medication Therapy Management Pharmacist       Medication Therapy Recommendations  Restless leg syndrome    Current Medication: pramipexole (MIRAPEX) 0.125 MG tablet   Rationale: Dose too low - Dosage too low - Effectiveness   Recommendation: Increase Dose   Status: Accepted per CPA

## 2024-04-12 ENCOUNTER — OFFICE VISIT (OUTPATIENT)
Dept: PHARMACY | Facility: PHYSICIAN GROUP | Age: 74
End: 2024-04-12
Payer: COMMERCIAL

## 2024-04-12 VITALS — SYSTOLIC BLOOD PRESSURE: 140 MMHG | BODY MASS INDEX: 41.62 KG/M2 | WEIGHT: 254 LBS | DIASTOLIC BLOOD PRESSURE: 82 MMHG

## 2024-04-12 DIAGNOSIS — E88.810 ABDOMINAL OBESITY AND METABOLIC SYNDROME: Primary | ICD-10-CM

## 2024-04-12 DIAGNOSIS — G25.81 RESTLESS LEG SYNDROME: ICD-10-CM

## 2024-04-12 DIAGNOSIS — Z78.9 TAKES DIETARY SUPPLEMENTS: ICD-10-CM

## 2024-04-12 DIAGNOSIS — E66.9 ABDOMINAL OBESITY AND METABOLIC SYNDROME: Primary | ICD-10-CM

## 2024-04-12 DIAGNOSIS — E78.2 MIXED HYPERLIPIDEMIA: ICD-10-CM

## 2024-04-12 PROCEDURE — 99207 PR NO CHARGE LOS: CPT | Performed by: PHARMACIST

## 2024-04-12 NOTE — PATIENT INSTRUCTIONS
"Recommendations from today's MTM visit:                                                       You may begin taking one tablet of pramipexole in the afternoon before onset of your restless leg symptoms. Continue taking your two tablets before bedtime as well.   Continue taking all other medications as prescribed.     Follow-up: Return in 4 weeks (on 5/10/2024) for Follow up, with me, in person.    It was great speaking with you today.  I value your experience and would be very thankful for your time in providing feedback in our clinic survey. In the next few days, you may receive an email or text message from [employer] with a link to a survey related to your  clinical pharmacist.\"     To schedule another MTM appointment, please call the clinic directly or you may call the MTM scheduling line at [phone].    My Clinical Pharmacist's contact information:                                                      Please feel free to contact me with any questions or concerns you have.      Sola Marquez, PharmD  Medication Therapy Management Pharmacist   "

## 2024-05-08 NOTE — PROGRESS NOTES
Medication Therapy Management (MTM) Encounter    ASSESSMENT:                            Medication Adherence/Access: No issues identified    Restless Leg:  Patient reports symptoms have improved since the dose change. Plan to continue regimen at this time.     PLAN:                            Continue taking all medications as prescribed.     Follow-up: Return in about 4 months (around 8/26/2024) for Follow up, with me.    SUBJECTIVE/OBJECTIVE:                          Shira Sauer is a 74 year old female coming in for an initial visit. She was referred to me from Rika Talyor.      Reason for visit: RLS medication management.     Allergies/ADRs: None  Past Medical History: Reviewed in chart  Tobacco: She reports that she has never smoked. She has never used smokeless tobacco. None   Alcohol: 2-4 drinks monthly      Medication Adherence/Access: no issues reported      Restless Leg:   Since our last visit, patient has increased her pramipexole dosing. Reports it is working well for her, bedtime symptoms are still well controlled. Denies side effects at this time.     Pramipexole Dihydrochloride 0.125 MG Tablet, Take one tablet in the afternoon and 2 tablets by mouth 2-3 hours before bed   Relaxing leg cream PM uses every night on both legs         Today's Vitals: /86 (BP Location: Left arm)   Pulse 97   Wt 256 lb (116.1 kg)   SpO2 99%   BMI 41.95 kg/m    ----------------    I spent 15 minutes with this patient today. All changes were made via collaborative practice agreement with KAROLINE Gutierrez CNP and I offer these suggestions for consideration by Rika Taylor. A copy of the visit note was provided to the patient's provider(s).    A summary of these recommendations was sent via [employer] to the patient.    Sola Marquez, PharmD  Medication Therapy Management Pharmacist       Medication Therapy Recommendations  No medication therapy recommendations to display

## 2024-05-10 ENCOUNTER — OFFICE VISIT (OUTPATIENT)
Dept: PHARMACY | Facility: PHYSICIAN GROUP | Age: 74
End: 2024-05-10
Payer: COMMERCIAL

## 2024-05-10 VITALS
BODY MASS INDEX: 41.95 KG/M2 | SYSTOLIC BLOOD PRESSURE: 136 MMHG | DIASTOLIC BLOOD PRESSURE: 86 MMHG | HEART RATE: 97 BPM | OXYGEN SATURATION: 99 % | WEIGHT: 256 LBS

## 2024-05-10 DIAGNOSIS — G25.81 RESTLESS LEG SYNDROME: Primary | ICD-10-CM

## 2024-05-10 PROCEDURE — 99207 PR NO CHARGE LOS: CPT | Performed by: PHARMACIST

## 2024-05-10 NOTE — PATIENT INSTRUCTIONS
"Recommendations from today's MTM visit:                                                       Continue taking all medications as prescribed.    Follow-up: Return in about 4 months (around 8/26/2024) for Follow up, with me.    It was great speaking with you today.  I value your experience and would be very thankful for your time in providing feedback in our clinic survey. In the next few days, you may receive an email or text message from [employer] with a link to a survey related to your  clinical pharmacist.\"     To schedule another MTM appointment, please call the clinic directly or you may call the MTM scheduling line at [phone].    My Clinical Pharmacist's contact information:                                                      Please feel free to contact me with any questions or concerns you have.      Sola Marquez, PharmD  Medication Therapy Management Pharmacist    "

## 2024-07-30 ENCOUNTER — LAB REQUISITION (OUTPATIENT)
Dept: LAB | Facility: CLINIC | Age: 74
End: 2024-07-30
Payer: COMMERCIAL

## 2024-07-30 DIAGNOSIS — E78.2 MIXED HYPERLIPIDEMIA: ICD-10-CM

## 2024-07-30 DIAGNOSIS — E55.9 VITAMIN D DEFICIENCY, UNSPECIFIED: ICD-10-CM

## 2024-07-30 DIAGNOSIS — E88.810 METABOLIC SYNDROME: ICD-10-CM

## 2024-07-30 LAB
ALBUMIN SERPL BCG-MCNC: 4 G/DL (ref 3.5–5.2)
ALP SERPL-CCNC: 56 U/L (ref 40–150)
ALT SERPL W P-5'-P-CCNC: 48 U/L (ref 0–50)
ANION GAP SERPL CALCULATED.3IONS-SCNC: 14 MMOL/L (ref 7–15)
AST SERPL W P-5'-P-CCNC: 34 U/L (ref 0–45)
BILIRUB SERPL-MCNC: 0.7 MG/DL
BUN SERPL-MCNC: 15.9 MG/DL (ref 8–23)
CALCIUM SERPL-MCNC: 9.8 MG/DL (ref 8.8–10.4)
CHLORIDE SERPL-SCNC: 103 MMOL/L (ref 98–107)
CHOLEST SERPL-MCNC: 154 MG/DL
CREAT SERPL-MCNC: 0.85 MG/DL (ref 0.51–0.95)
EGFRCR SERPLBLD CKD-EPI 2021: 72 ML/MIN/1.73M2
FASTING STATUS PATIENT QL REPORTED: ABNORMAL
FASTING STATUS PATIENT QL REPORTED: ABNORMAL
GLUCOSE SERPL-MCNC: 155 MG/DL (ref 70–99)
HCO3 SERPL-SCNC: 24 MMOL/L (ref 22–29)
HDLC SERPL-MCNC: 42 MG/DL
LDLC SERPL CALC-MCNC: 55 MG/DL
NONHDLC SERPL-MCNC: 112 MG/DL
POTASSIUM SERPL-SCNC: 4.4 MMOL/L (ref 3.4–5.3)
PROT SERPL-MCNC: 7 G/DL (ref 6.4–8.3)
SODIUM SERPL-SCNC: 141 MMOL/L (ref 135–145)
TRIGL SERPL-MCNC: 285 MG/DL
VIT D+METAB SERPL-MCNC: 47 NG/ML (ref 20–50)

## 2024-07-30 PROCEDURE — 80061 LIPID PANEL: CPT | Mod: ORL | Performed by: NURSE PRACTITIONER

## 2024-07-30 PROCEDURE — 80053 COMPREHEN METABOLIC PANEL: CPT | Mod: ORL | Performed by: NURSE PRACTITIONER

## 2024-07-30 PROCEDURE — 82306 VITAMIN D 25 HYDROXY: CPT | Mod: ORL | Performed by: NURSE PRACTITIONER

## 2024-08-20 NOTE — PROGRESS NOTES
Medication Therapy Management (MTM) Encounter    ASSESSMENT:                            Medication Adherence/Access: No issues identified    Restless Leg:  Stable. Plan to continue regimen at this time.     Hypertension: Improved. In office reading today is at goal. Patient will continue checking her blood pressures a few times each week.     PLAN:                            Continue taking all medications as prescribed.     Follow-up: Return in 4 months (on 12/18/2024).    SUBJECTIVE/OBJECTIVE:                          Shira Sauer is a 74 year old female coming in for a follow up visit. She was referred to me from Rika Taylor.      Reason for visit: medication management.     Allergies/ADRs: None  Past Medical History: Reviewed in chart  Tobacco: She reports that she has never smoked. She has never used smokeless tobacco. None   Alcohol: 2-4 drinks monthly      Medication Adherence/Access: no issues reported      Restless Leg:   Patient reports bedtime symptoms are still well controlled. Denies side effects at this time.     Pramipexole Dihydrochloride 0.125 MG Tablet, Take one tablet in the afternoon and 2 tablets by mouth 2-3 hours before bed   Relaxing leg cream PM uses every night on both legs     Hypertension:  Patient's blood pressure was elevated at previous office visits. Patient was checking her bp at home and did note some inconsistencies. Has not had any issues the last few weeks. Denies episodes of lightheadedness, dizziness, headaches. She currently checks her blood pressures a few times each week. She did not have her readings with her today in the clinic.       Today's Vitals: /64 (BP Location: Left arm)   Wt 261 lb 3.2 oz (118.5 kg)   BMI 42.80 kg/m    ----------------    I spent 20 minutes with this patient today. All changes were made via collaborative practice agreement with KAROLINE Gutierrez CNP and I offer these suggestions for consideration by Rika Taylor. A copy of the visit  note was provided to the patient's provider(s).    A summary of these recommendations was sent via [employer] to the patient.    Sola Marquez, Floyd  Medication Therapy Management Pharmacist       Medication Therapy Recommendations  No medication therapy recommendations to display

## 2024-08-26 ENCOUNTER — OFFICE VISIT (OUTPATIENT)
Dept: PHARMACY | Facility: PHYSICIAN GROUP | Age: 74
End: 2024-08-26
Payer: COMMERCIAL

## 2024-08-26 VITALS — BODY MASS INDEX: 42.8 KG/M2 | SYSTOLIC BLOOD PRESSURE: 112 MMHG | DIASTOLIC BLOOD PRESSURE: 64 MMHG | WEIGHT: 261.2 LBS

## 2024-08-26 DIAGNOSIS — G25.81 RESTLESS LEG SYNDROME: Primary | ICD-10-CM

## 2024-08-26 DIAGNOSIS — I10 BENIGN ESSENTIAL HYPERTENSION: ICD-10-CM

## 2024-08-26 PROCEDURE — 99207 PR NO CHARGE LOS: CPT | Performed by: PHARMACIST

## 2024-08-26 NOTE — PATIENT INSTRUCTIONS
"Recommendations from today's MTM visit:                                                       Continue taking all medications as prescribed.     Follow-up: Return in 4 months (on 12/18/2024).    It was great speaking with you today.  I value your experience and would be very thankful for your time in providing feedback in our clinic survey. In the next few days, you may receive an email or text message from [facility] [employer] with a link to a survey related to your  clinical pharmacist.\"     To schedule another MTM appointment, please call the clinic directly or you may call the MTM scheduling line at [phone].    My Clinical Pharmacist's contact information:                                                      Please feel free to contact me with any questions or concerns you have.      Sola Marquez, PharmD  Medication Therapy Management Pharmacist    "

## 2024-12-17 NOTE — PROGRESS NOTES
Medication Therapy Management (MTM) Encounter    ASSESSMENT:                            Medication Adherence/Access: No issues identified    Restless Leg:    Stable. Plan to continue regimen at this time.     Hypertension   Stable. Patient would benefit from implementing non-drug therapies.    Neck Pain:  Working with [facility] for management.     PLAN:                            Continue taking all medications as prescribed.   Focus on dietary modifications and increasing physical activity.     Follow-up: Return in about 6 months (around 6/18/2025) for Follow up, with me.    SUBJECTIVE/OBJECTIVE:                          Shira Sauer is a 74 year old female coming in for a follow up visit. She was referred to me from Rika Taylor.      Reason for visit: medication management.     Allergies/ADRs: None  Past Medical History: Reviewed in chart  Tobacco: She reports that she has never smoked. She has never used smokeless tobacco.   Alcohol: 2-4 drinks monthly      Medication Adherence/Access: no issues reported        Restless Leg:   Patient reports bedtime symptoms are still controlled. Denies side effects at this time.     Pramipexole Dihydrochloride 0.125 MG Tablet, Take one tablet in the afternoon and 2 tablets by mouth 2-3 hours before bed   Relaxing leg cream PM uses every night on both legs     Hypertension:  Patient's blood pressure has improved since our last visit. Does report she is not very active since her neck injury. Encouraged physical activity to help with blood pressure control.     Neck Pain:  Meloxicam 7.5 mg tablet by mouth twice daily   Patient is working with [facility] for management. She does have an MRI scheduled for next Monday. Reports ongoing neck pain of unknown origin. She was also given a referral for PT and will be seeing them later this week. Denies issues or side effects with meloxicam.       Today's Vitals: /86   Wt 259 lb (117.5 kg)   BMI 42.44 kg/m     ----------------    I spent 20 minutes with this patient today. All changes were made via collaborative practice agreement with KAROLINE Gutierrez CNP and I offer these suggestions for consideration by Rika Taylor. A copy of the visit note was provided to the patient's provider(s).    A summary of these recommendations was sent via [employer] to the patient.    Sola Marquez, PharmD  Medication Therapy Management Pharmacist       Medication Therapy Recommendations  No medication therapy recommendations to display

## 2024-12-18 ENCOUNTER — OFFICE VISIT (OUTPATIENT)
Dept: PHARMACY | Facility: PHYSICIAN GROUP | Age: 74
End: 2024-12-18
Payer: COMMERCIAL

## 2024-12-18 VITALS — SYSTOLIC BLOOD PRESSURE: 132 MMHG | DIASTOLIC BLOOD PRESSURE: 86 MMHG | BODY MASS INDEX: 42.44 KG/M2 | WEIGHT: 259 LBS

## 2024-12-18 DIAGNOSIS — I10 BENIGN ESSENTIAL HYPERTENSION: ICD-10-CM

## 2024-12-18 DIAGNOSIS — M54.2 NECK PAIN: ICD-10-CM

## 2024-12-18 DIAGNOSIS — G25.81 RESTLESS LEG SYNDROME: Primary | ICD-10-CM

## 2024-12-18 RX ORDER — MELOXICAM 7.5 MG/1
1 TABLET ORAL
COMMUNITY
Start: 2024-12-11

## 2024-12-18 NOTE — PATIENT INSTRUCTIONS
"Recommendations from today's MTM visit:                                                       Continue taking all medications as prescribed.   Focus on dietary modifications and increasing physical activity.     Follow-up: Return in about 6 months (around 6/18/2025) for Follow up, with me.    It was great speaking with you today.  I value your experience and would be very thankful for your time in providing feedback in our clinic survey. In the next few days, you may receive an email or text message from [employer] with a link to a survey related to your  clinical pharmacist.\"     To schedule another MTM appointment, please call the clinic directly or you may call the MTM scheduling line at [phone].    My Clinical Pharmacist's contact information:                                                      Please feel free to contact me with any questions or concerns you have.      Sola Marquez, PharmD  Medication Therapy Management Pharmacist    "

## 2024-12-28 ENCOUNTER — HEALTH MAINTENANCE LETTER (OUTPATIENT)
Age: 74
End: 2024-12-28

## 2025-02-11 ENCOUNTER — LAB REQUISITION (OUTPATIENT)
Dept: LAB | Facility: CLINIC | Age: 75
End: 2025-02-11
Payer: COMMERCIAL

## 2025-02-11 DIAGNOSIS — E78.2 MIXED HYPERLIPIDEMIA: ICD-10-CM

## 2025-02-11 LAB
ANION GAP SERPL CALCULATED.3IONS-SCNC: 15 MMOL/L (ref 7–15)
BUN SERPL-MCNC: 16.1 MG/DL (ref 8–23)
CALCIUM SERPL-MCNC: 10.3 MG/DL (ref 8.8–10.4)
CHLORIDE SERPL-SCNC: 102 MMOL/L (ref 98–107)
CHOLEST SERPL-MCNC: 140 MG/DL
CREAT SERPL-MCNC: 0.83 MG/DL (ref 0.51–0.95)
EGFRCR SERPLBLD CKD-EPI 2021: 74 ML/MIN/1.73M2
FASTING STATUS PATIENT QL REPORTED: YES
FASTING STATUS PATIENT QL REPORTED: YES
GLUCOSE SERPL-MCNC: 164 MG/DL (ref 70–99)
HCO3 SERPL-SCNC: 24 MMOL/L (ref 22–29)
HDLC SERPL-MCNC: 45 MG/DL
LDLC SERPL CALC-MCNC: 41 MG/DL
NONHDLC SERPL-MCNC: 95 MG/DL
POTASSIUM SERPL-SCNC: 4.5 MMOL/L (ref 3.4–5.3)
SODIUM SERPL-SCNC: 141 MMOL/L (ref 135–145)
TRIGL SERPL-MCNC: 272 MG/DL

## 2025-02-11 PROCEDURE — 80048 BASIC METABOLIC PNL TOTAL CA: CPT | Performed by: FAMILY MEDICINE

## 2025-02-11 PROCEDURE — 82947 ASSAY GLUCOSE BLOOD QUANT: CPT | Performed by: FAMILY MEDICINE

## 2025-02-11 PROCEDURE — 82310 ASSAY OF CALCIUM: CPT | Performed by: FAMILY MEDICINE

## 2025-02-11 PROCEDURE — 80061 LIPID PANEL: CPT | Performed by: FAMILY MEDICINE

## 2025-04-21 ENCOUNTER — LAB REQUISITION (OUTPATIENT)
Dept: LAB | Facility: CLINIC | Age: 75
End: 2025-04-21
Payer: COMMERCIAL

## 2025-04-21 DIAGNOSIS — I10 ESSENTIAL (PRIMARY) HYPERTENSION: ICD-10-CM

## 2025-04-21 PROCEDURE — 80048 BASIC METABOLIC PNL TOTAL CA: CPT | Mod: ORL | Performed by: FAMILY MEDICINE

## 2025-04-22 LAB
ANION GAP SERPL CALCULATED.3IONS-SCNC: 14 MMOL/L (ref 7–15)
BUN SERPL-MCNC: 17.6 MG/DL (ref 8–23)
CALCIUM SERPL-MCNC: 10.1 MG/DL (ref 8.8–10.4)
CHLORIDE SERPL-SCNC: 103 MMOL/L (ref 98–107)
CREAT SERPL-MCNC: 0.81 MG/DL (ref 0.51–0.95)
EGFRCR SERPLBLD CKD-EPI 2021: 76 ML/MIN/1.73M2
GLUCOSE SERPL-MCNC: 157 MG/DL (ref 70–99)
HCO3 SERPL-SCNC: 23 MMOL/L (ref 22–29)
POTASSIUM SERPL-SCNC: 4.1 MMOL/L (ref 3.4–5.3)
SODIUM SERPL-SCNC: 140 MMOL/L (ref 135–145)

## 2025-07-03 NOTE — PROGRESS NOTES
Medication Therapy Management (MTM) Encounter    ASSESSMENT:                            Medication Adherence/Access: No issues identified.      Hypertension   Stable.  Meeting BP goal <130/80.     Metabolic Syndrome   Stable.  Encouraged patient to continue working on dietary adjustments and staying active for weight loss.     Hyperlipidemia   Stable.       Supplements   Stable.       RLS  Stable.      PLAN:                            Continue taking medications as prescribed.   Continue to focus on dietary and lifestyle changes.     Follow-up: Return in 5 weeks (on 8/12/2025) for Follow up, with PCP.    SUBJECTIVE/OBJECTIVE:                          Shira Sauer is a 75 year old female seen for a follow-up visit.       Reason for visit: Yearly medication review.    Allergies/ADRs: None  Past Medical History: Reviewed in chart  Tobacco: She reports that she has never smoked. She has never used smokeless tobacco.  Alcohol: none    Medication Adherence/Access: no issues reported.      Hypertension   Losartan 25 mg tablet by mouth daily   Patient reports no current medication side effects.  Patient self-monitors blood pressure.  Home BP monitoring 110-120s/80s.      Metabolic Syndrome   Metformin HCl  MG Tablet 4 tablet with evening meal Orally Once a day   Patient is working with [facility] for management. Encouraged to work on weight management by specialist.        Hyperlipidemia   Rosuvastatin 10 mg daily  Patient reports no significant myalgias or other side effects.     Supplements   Magnesium 400 MG Tablet once daily   Glucosamine-Chondroitin 250-200 MG Tablet, 2 tablet with a meal Orally Once a day   Calcium Citrate 630 mg tablet with D3 10 mcg one capsule daily   Vitamin D3 25 MCG (1000 UT) Capsule, 1 capsule Orally Once a day   PreserVision AREDS - Tablet, one tablet twice daily   Multivitamin - Tablet, 1 tablet orally once a day   Taking for general health and denies side effects at this time.        RLS  Pramipexole Dihydrochloride 0.125 MG Tablet, Take one tablet in the afternoon and take 2 tablets  by mouth 2-3 hours before bed  Magnilife Relaxing Leg Cream PM - nightly as needed   Blue Emu as needed for pain relief   Patient reports current regimen is still effective at managing restless leg symptoms.         Today's Vitals: /82   Pulse 94   Wt 260 lb (117.9 kg)   SpO2 96%   BMI 42.61 kg/m    ----------------      I spent 30 minutes with this patient today. All changes were made via collaborative practice agreement with Chi Frias MD.     A summary of these recommendations was sent via [employer].    Marty ChungD  Medication Therapy Management Pharmacist          Medication Therapy Recommendations  No medication therapy recommendations to display

## 2025-07-07 ENCOUNTER — OFFICE VISIT (OUTPATIENT)
Dept: PHARMACY | Facility: PHYSICIAN GROUP | Age: 75
End: 2025-07-07
Payer: COMMERCIAL

## 2025-07-07 VITALS
OXYGEN SATURATION: 96 % | WEIGHT: 260 LBS | BODY MASS INDEX: 42.61 KG/M2 | HEART RATE: 94 BPM | SYSTOLIC BLOOD PRESSURE: 124 MMHG | DIASTOLIC BLOOD PRESSURE: 82 MMHG

## 2025-07-07 DIAGNOSIS — I10 BENIGN ESSENTIAL HYPERTENSION: Primary | ICD-10-CM

## 2025-07-07 DIAGNOSIS — Z78.9 TAKES DIETARY SUPPLEMENTS: ICD-10-CM

## 2025-07-07 DIAGNOSIS — G25.81 RESTLESS LEG SYNDROME: ICD-10-CM

## 2025-07-07 DIAGNOSIS — E78.2 MIXED HYPERLIPIDEMIA: ICD-10-CM

## 2025-07-07 DIAGNOSIS — E88.810 METABOLIC SYNDROME: ICD-10-CM

## 2025-07-07 PROCEDURE — 99207 PR NO CHARGE LOS: CPT | Performed by: PHARMACIST

## 2025-07-07 RX ORDER — LOSARTAN POTASSIUM 25 MG/1
25 TABLET ORAL DAILY
COMMUNITY
Start: 2025-04-29

## 2025-07-07 NOTE — PATIENT INSTRUCTIONS
"Recommendations from today's MTM visit:                                                       Continue taking medications as prescribed.   Continue to focus on dietary and lifestyle changes.     Follow-up: Return in 5 weeks (on 8/12/2025) for Follow up, with PCP.    It was great speaking with you today.  I value your experience and would be very thankful for your time in providing feedback in our clinic survey. In the next few days, you may receive an email or text message from [employer] with a link to a survey related to your  clinical pharmacist.\"     To schedule another MTM appointment, please call the clinic directly or you may call the MTM scheduling line at [phone].    My Clinical Pharmacist's contact information:                                                      Please feel free to contact me with any questions or concerns you have.      Sola Marquez, PharmD  Medication Therapy Management Pharmacist    "

## 2025-08-12 ENCOUNTER — LAB REQUISITION (OUTPATIENT)
Dept: LAB | Facility: CLINIC | Age: 75
End: 2025-08-12
Payer: COMMERCIAL

## 2025-08-12 DIAGNOSIS — E78.2 MIXED HYPERLIPIDEMIA: ICD-10-CM

## 2025-08-12 DIAGNOSIS — I10 ESSENTIAL (PRIMARY) HYPERTENSION: ICD-10-CM

## 2025-08-12 DIAGNOSIS — R35.0 FREQUENCY OF MICTURITION: ICD-10-CM

## 2025-08-12 LAB
ANION GAP SERPL CALCULATED.3IONS-SCNC: 18 MMOL/L (ref 7–15)
BUN SERPL-MCNC: 15.8 MG/DL (ref 8–23)
CALCIUM SERPL-MCNC: 10.2 MG/DL (ref 8.8–10.4)
CHLORIDE SERPL-SCNC: 100 MMOL/L (ref 98–107)
CHOLEST SERPL-MCNC: 133 MG/DL
CREAT SERPL-MCNC: 0.78 MG/DL (ref 0.51–0.95)
EGFRCR SERPLBLD CKD-EPI 2021: 79 ML/MIN/1.73M2
FASTING STATUS PATIENT QL REPORTED: ABNORMAL
FASTING STATUS PATIENT QL REPORTED: ABNORMAL
GLUCOSE SERPL-MCNC: 151 MG/DL (ref 70–99)
HCO3 SERPL-SCNC: 23 MMOL/L (ref 22–29)
HDLC SERPL-MCNC: 42 MG/DL
LDLC SERPL CALC-MCNC: 44 MG/DL
NONHDLC SERPL-MCNC: 91 MG/DL
POTASSIUM SERPL-SCNC: 4.3 MMOL/L (ref 3.4–5.3)
SODIUM SERPL-SCNC: 141 MMOL/L (ref 135–145)
TRIGL SERPL-MCNC: 237 MG/DL

## 2025-08-13 LAB — BACTERIA UR CULT: NORMAL
